# Patient Record
Sex: FEMALE | Race: OTHER | Employment: FULL TIME | URBAN - METROPOLITAN AREA
[De-identification: names, ages, dates, MRNs, and addresses within clinical notes are randomized per-mention and may not be internally consistent; named-entity substitution may affect disease eponyms.]

---

## 2024-04-24 ENCOUNTER — OFFICE VISIT (OUTPATIENT)
Dept: FAMILY MEDICINE CLINIC | Age: 29
End: 2024-04-24
Payer: COMMERCIAL

## 2024-04-24 VITALS
WEIGHT: 227 LBS | OXYGEN SATURATION: 97 % | SYSTOLIC BLOOD PRESSURE: 118 MMHG | TEMPERATURE: 97.8 F | HEIGHT: 67 IN | DIASTOLIC BLOOD PRESSURE: 80 MMHG | HEART RATE: 91 BPM | BODY MASS INDEX: 35.63 KG/M2

## 2024-04-24 DIAGNOSIS — M54.40 CHRONIC BILATERAL LOW BACK PAIN WITH SCIATICA, SCIATICA LATERALITY UNSPECIFIED: ICD-10-CM

## 2024-04-24 DIAGNOSIS — Z13.0 SCREENING FOR IRON DEFICIENCY ANEMIA: ICD-10-CM

## 2024-04-24 DIAGNOSIS — R20.2 PARESTHESIA AND PAIN OF BOTH UPPER EXTREMITIES: Primary | ICD-10-CM

## 2024-04-24 DIAGNOSIS — Z13.1 ENCOUNTER FOR SCREENING EXAMINATION FOR IMPAIRED GLUCOSE REGULATION AND DIABETES MELLITUS: ICD-10-CM

## 2024-04-24 DIAGNOSIS — R53.83 FATIGUE, UNSPECIFIED TYPE: ICD-10-CM

## 2024-04-24 DIAGNOSIS — M79.602 PARESTHESIA AND PAIN OF BOTH UPPER EXTREMITIES: Primary | ICD-10-CM

## 2024-04-24 DIAGNOSIS — M79.601 PARESTHESIA AND PAIN OF BOTH UPPER EXTREMITIES: Primary | ICD-10-CM

## 2024-04-24 DIAGNOSIS — F32.A DEPRESSION, UNSPECIFIED DEPRESSION TYPE: ICD-10-CM

## 2024-04-24 DIAGNOSIS — L50.3 DERMATOGRAPHIA: ICD-10-CM

## 2024-04-24 DIAGNOSIS — Z13.220 LIPID SCREENING: ICD-10-CM

## 2024-04-24 DIAGNOSIS — G89.29 CHRONIC BILATERAL LOW BACK PAIN WITH SCIATICA, SCIATICA LATERALITY UNSPECIFIED: ICD-10-CM

## 2024-04-24 DIAGNOSIS — M62.838 NECK MUSCLE SPASM: ICD-10-CM

## 2024-04-24 LAB
ALBUMIN SERPL-MCNC: 4.3 G/DL (ref 3.5–4.6)
ALP SERPL-CCNC: 55 U/L (ref 40–130)
ALT SERPL-CCNC: 18 U/L (ref 0–33)
ANION GAP SERPL CALCULATED.3IONS-SCNC: 10 MEQ/L (ref 9–15)
AST SERPL-CCNC: 28 U/L (ref 0–35)
BASOPHILS # BLD: 0 K/UL (ref 0–0.2)
BASOPHILS NFR BLD: 0.8 %
BILIRUB SERPL-MCNC: <0.2 MG/DL (ref 0.2–0.7)
BUN SERPL-MCNC: 14 MG/DL (ref 6–20)
CALCIUM SERPL-MCNC: 9.3 MG/DL (ref 8.5–9.9)
CHLORIDE SERPL-SCNC: 105 MEQ/L (ref 95–107)
CHOLEST SERPL-MCNC: 168 MG/DL (ref 0–199)
CO2 SERPL-SCNC: 22 MEQ/L (ref 20–31)
CREAT SERPL-MCNC: 0.71 MG/DL (ref 0.5–0.9)
EOSINOPHIL # BLD: 0.2 K/UL (ref 0–0.7)
EOSINOPHIL NFR BLD: 3.4 %
ERYTHROCYTE [DISTWIDTH] IN BLOOD BY AUTOMATED COUNT: 12.7 % (ref 11.5–14.5)
GLOBULIN SER CALC-MCNC: 3.3 G/DL (ref 2.3–3.5)
GLUCOSE SERPL-MCNC: 87 MG/DL (ref 70–99)
HCT VFR BLD AUTO: 40.3 % (ref 37–47)
HDLC SERPL-MCNC: 38 MG/DL (ref 40–59)
HGB BLD-MCNC: 13.5 G/DL (ref 12–16)
LDL CHOLESTEROL CALCULATED: 109 MG/DL (ref 0–129)
LYMPHOCYTES # BLD: 1.7 K/UL (ref 1–4.8)
LYMPHOCYTES NFR BLD: 34.2 %
MCH RBC QN AUTO: 28.5 PG (ref 27–31.3)
MCHC RBC AUTO-ENTMCNC: 33.5 % (ref 33–37)
MCV RBC AUTO: 85 FL (ref 79.4–94.8)
MONOCYTES # BLD: 0.4 K/UL (ref 0.2–0.8)
MONOCYTES NFR BLD: 7.4 %
NEUTROPHILS # BLD: 2.7 K/UL (ref 1.4–6.5)
NEUTS SEG NFR BLD: 53.8 %
PLATELET # BLD AUTO: 294 K/UL (ref 130–400)
POTASSIUM SERPL-SCNC: 4.3 MEQ/L (ref 3.4–4.9)
PROT SERPL-MCNC: 7.6 G/DL (ref 6.3–8)
RBC # BLD AUTO: 4.74 M/UL (ref 4.2–5.4)
SODIUM SERPL-SCNC: 137 MEQ/L (ref 135–144)
TRIGLYCERIDE, FASTING: 104 MG/DL (ref 0–150)
TSH REFLEX: 1.03 UIU/ML (ref 0.44–3.86)
WBC # BLD AUTO: 5 K/UL (ref 4.8–10.8)

## 2024-04-24 PROCEDURE — 99214 OFFICE O/P EST MOD 30 MIN: CPT | Performed by: PHYSICIAN ASSISTANT

## 2024-04-24 RX ORDER — ETONOGESTREL AND ETHINYL ESTRADIOL VAGINAL RING .015; .12 MG/D; MG/D
1 RING VAGINAL SEE ADMIN INSTRUCTIONS
COMMUNITY

## 2024-04-24 RX ORDER — LACTOBACILLUS RHAMNOSUS GG 10B CELL
CAPSULE ORAL
COMMUNITY

## 2024-04-24 SDOH — ECONOMIC STABILITY: FOOD INSECURITY: WITHIN THE PAST 12 MONTHS, YOU WORRIED THAT YOUR FOOD WOULD RUN OUT BEFORE YOU GOT MONEY TO BUY MORE.: NEVER TRUE

## 2024-04-24 SDOH — ECONOMIC STABILITY: HOUSING INSECURITY
IN THE LAST 12 MONTHS, WAS THERE A TIME WHEN YOU DID NOT HAVE A STEADY PLACE TO SLEEP OR SLEPT IN A SHELTER (INCLUDING NOW)?: NO

## 2024-04-24 SDOH — ECONOMIC STABILITY: FOOD INSECURITY: WITHIN THE PAST 12 MONTHS, THE FOOD YOU BOUGHT JUST DIDN'T LAST AND YOU DIDN'T HAVE MONEY TO GET MORE.: NEVER TRUE

## 2024-04-24 SDOH — ECONOMIC STABILITY: INCOME INSECURITY: HOW HARD IS IT FOR YOU TO PAY FOR THE VERY BASICS LIKE FOOD, HOUSING, MEDICAL CARE, AND HEATING?: NOT HARD AT ALL

## 2024-04-24 ASSESSMENT — PATIENT HEALTH QUESTIONNAIRE - PHQ9
2. FEELING DOWN, DEPRESSED OR HOPELESS: NOT AT ALL
4. FEELING TIRED OR HAVING LITTLE ENERGY: NEARLY EVERY DAY
8. MOVING OR SPEAKING SO SLOWLY THAT OTHER PEOPLE COULD HAVE NOTICED. OR THE OPPOSITE, BEING SO FIGETY OR RESTLESS THAT YOU HAVE BEEN MOVING AROUND A LOT MORE THAN USUAL: NOT AT ALL
9. THOUGHTS THAT YOU WOULD BE BETTER OFF DEAD, OR OF HURTING YOURSELF: NOT AT ALL
SUM OF ALL RESPONSES TO PHQ QUESTIONS 1-9: 8
3. TROUBLE FALLING OR STAYING ASLEEP: MORE THAN HALF THE DAYS
7. TROUBLE CONCENTRATING ON THINGS, SUCH AS READING THE NEWSPAPER OR WATCHING TELEVISION: SEVERAL DAYS
SUM OF ALL RESPONSES TO PHQ QUESTIONS 1-9: 8
10. IF YOU CHECKED OFF ANY PROBLEMS, HOW DIFFICULT HAVE THESE PROBLEMS MADE IT FOR YOU TO DO YOUR WORK, TAKE CARE OF THINGS AT HOME, OR GET ALONG WITH OTHER PEOPLE: NOT DIFFICULT AT ALL
6. FEELING BAD ABOUT YOURSELF - OR THAT YOU ARE A FAILURE OR HAVE LET YOURSELF OR YOUR FAMILY DOWN: NOT AT ALL
SUM OF ALL RESPONSES TO PHQ QUESTIONS 1-9: 8
SUM OF ALL RESPONSES TO PHQ QUESTIONS 1-9: 8
SUM OF ALL RESPONSES TO PHQ9 QUESTIONS 1 & 2: 0
5. POOR APPETITE OR OVEREATING: MORE THAN HALF THE DAYS
1. LITTLE INTEREST OR PLEASURE IN DOING THINGS: NOT AT ALL
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
## 2024-04-25 LAB
FOLATE: >20 NG/ML (ref 4.8–24.2)
VITAMIN B-12: 320 PG/ML (ref 232–1245)
VITAMIN D 25-HYDROXY: 40.8 NG/ML (ref 30–100)

## 2024-04-28 LAB
A ALTERNATA IGE QN: <0.1 KU/L (ref 0–0.34)
A FUMIGATUS IGE QN: <0.1 KU/L (ref 0–0.34)
AMER SYCAMORE IGE QN: <0.1 KU/L (ref 0–0.34)
BERMUDA GRASS IGE QN: <0.1 KU/L (ref 0–0.34)
BOXELDER IGE QN: 0.73 KU/L (ref 0–0.34)
C SPHAEROSPERMUM IGE QN: <0.1 KUL/L (ref 0–0.34)
CALIF WALNUT IGE QN: <0.1 KU/L (ref 0–0.34)
CAT DANDER IGE QN: <0.1 KU/L (ref 0–0.34)
CMN PIGWEED IGE QN: <0.1 KU/L (ref 0–0.34)
COMMON RAGWEED IGE QN: <0.1 KU/L (ref 0–0.34)
COTTONWOOD IGE QN: <0.1 KU/L (ref 0–0.34)
D FARINAE IGE QN: 1.83 KU/L (ref 0–0.34)
D PTERONYSS IGE QN: 0.53 KU/L (ref 0–0.34)
DOG DANDER IGE QN: <0.1 KU/L (ref 0–0.34)
IGE SERPL-ACNC: 223 IU/ML (ref 0–100)
M RACEMOSUS IGE QN: <0.1 KU/L (ref 0–0.34)
MOUSE EPITH IGE QN: <0.1 KU/L (ref 0–0.34)
P NOTATUM IGE QN: <0.1 KU/L (ref 0–0.34)
PECAN/HICK TREE IGE QN: <0.1 KU/L (ref 0–0.34)
RED CEDAR IGE QN: <0.1 KU/L (ref 0–0.34)
ROACH IGE QN: <0.1 KU/L (ref 0–0.34)
SALTWORT IGE QN: <0.1 KU/L (ref 0–0.34)
SHEEP SORREL IGE QN: <0.1 KU/L (ref 0–0.34)
SILVER BIRCH IGE QN: <0.1 KU/L (ref 0–0.34)
TIMOTHY IGE QN: <0.1 KU/L (ref 0–0.34)
WHITE ASH IGE QN: <0.1 KU/L (ref 0–0.34)
WHITE ELM IGE QN: <0.1 KU/L (ref 0–0.34)
WHITE MULBERRY IGE QN: <0.1 KU/L (ref 0–0.34)
WHITE OAK IGE QN: <0.1 KU/L (ref 0–0.34)

## 2024-04-29 ENCOUNTER — TELEPHONE (OUTPATIENT)
Dept: FAMILY MEDICINE CLINIC | Age: 29
End: 2024-04-29

## 2024-04-29 RX ORDER — LORATADINE 10 MG/1
10 TABLET ORAL DAILY
Qty: 30 TABLET | Refills: 3 | Status: SHIPPED | OUTPATIENT
Start: 2024-04-29

## 2024-04-29 NOTE — TELEPHONE ENCOUNTER
Prescription for Claritin take as needed if symptoms are worsening may need to take for daily for a week then may resume as needed

## 2024-04-29 NOTE — TELEPHONE ENCOUNTER
Patient asking if there is anything that can be sent in for her allergies to to the dust mites and the boxelder maple     Please send RX to- [facility] on [address]

## 2024-05-16 ENCOUNTER — HOSPITAL ENCOUNTER (OUTPATIENT)
Dept: PHYSICAL THERAPY | Age: 29
Setting detail: THERAPIES SERIES
Discharge: HOME | End: 2024-05-16
Payer: COMMERCIAL

## 2024-05-16 PROCEDURE — 97161 PT EVAL LOW COMPLEX 20 MIN: CPT

## 2024-05-16 PROCEDURE — 97110 THERAPEUTIC EXERCISES: CPT

## 2024-05-16 ASSESSMENT — PAIN DESCRIPTION - DESCRIPTORS: DESCRIPTORS: ACHING;THROBBING

## 2024-05-16 ASSESSMENT — PAIN DESCRIPTION - PAIN TYPE: TYPE: CHRONIC PAIN

## 2024-05-16 ASSESSMENT — PAIN DESCRIPTION - ORIENTATION: ORIENTATION: LOWER

## 2024-05-16 ASSESSMENT — PAIN DESCRIPTION - LOCATION: LOCATION: BACK

## 2024-05-16 ASSESSMENT — PAIN SCALES - GENERAL: PAINLEVEL_OUTOF10: 0

## 2024-05-16 NOTE — PLAN OF CARE
[address] [place] [address]  Phone: [phone]    [] Certification  [] Recertification [x]  Plan of Care  [] Progress Note [] Discharge      Referring Provider: Le Olivares PA-C     From:  Anel Sinclair, PT, DPT  Patient: Dian Montgomery (29 y.o. female) : 1995 Date: 2024  Medical Diagnosis: Paresthesia and pain of both upper extremities [R20.2, M79.601, M79.602]  Chronic bilateral low back pain with sciatica, sciatica laterality unspecified [M54.40, G89.29]  Neck muscle spasm [M62.838]       Treatment Diagnosis: LBP, B hand N/T, decreased B hip IR/ER AROM, decreased core and trunk strength, decreased Hip strength, decreased postural awareness, decreased cervical AROM, and decreased postural strength/periscap strength    Plan of Care/Certification Expiration Date:     Progress Report Period from:  2024  to 2024    Visits to Date: 1 No Show: 0 Cancelled Appts: 0    OBJECTIVE:   Short Term Goals - Time Frame for Short Term Goals: 2-3 weeks    Goals Current/Discharge status  Status   Short Term Goal 1: The pt will demo improved postural awareness with </=25% VC's required throughout tx   Poor postural awareness   New     Long Term Goals - Time Frame for Long Term Goals : 4-6 weeks  Goals Current/ Discharge status Status   Long Term Goal 1: The pt will have a decrease in TERESE and NDI score to </=2/50 points in order to increase functional activity tolerance  Exam: TERESE 10/45 NDI 7/45     New   Long Term Goal 2: The pt will be indep/compliant with HEP in order to self-manage symptoms upon D/C  ongoing   New   Long Term Goal 3: The pt will demo improved B hip IR/ER AROM WNL's in order to increase ease of work related duties   AROM LLE (degrees)  L Hip External Rotation (0-45): 36  L Hip Internal Rotation (0-45): 42   AROM RLE (degrees)  R Hip External Rotation (0-45): 30  R Hip Internal Rotation (0-45): 38    New   Long Term Goal 4: The pt will demo

## 2024-05-16 NOTE — THERAPY EVALUATION
[address] [place] [address]  Phone: [phone]                             Physical Therapy: Initial Evaluation    Patient: Dian Montgomery (29 y.o.     female)   Examination Date: 2024   :  1995 ;    Confirmed: Yes MRN: [id number]  CSN: [id number]   Insurance: Payor: [facility] / Plan: [facility] [place] PPO / Product Type: *No Product type* /   Insurance ID: [id number] - ([facility])    Secondary Insurance (if applicable):     Referring Physician: Le Olivares PA-C       Visits to Date/Visits Approved:     No Show/Cancelled Appts: 0 /      Medical Diagnosis: Paresthesia and pain of both upper extremities [R20.2, M79.601, M79.602]  Chronic bilateral low back pain with sciatica, sciatica laterality unspecified [M54.40, G89.29]  Neck muscle spasm [M62.838]        Treatment Diagnosis: LBP, B hand N/T, decreased B hip IR/ER AROM, decreased core and trunk strength, decreased Hip strength, decreased postural awareness, decreased cervical AROM, and decreased postural strength/periscap strength     PERTINENT MEDICAL HISTORY   Patient Assessed for Rehabilitation Services: Yes       Medical History: Chart Reviewed: Yes   Past Medical History:   Diagnosis Date    Anxiety     Degeneration of eye     Both    Postpartum depression     Seizures (HCC)     last episode over 10 years ago, dont take anything     Surgical History:   Past Surgical History:   Procedure Laterality Date    EYE SURGERY Bilateral     laser eye surgery       Medications:   Current Outpatient Medications:     loratadine (CLARITIN) 10 MG tablet, Take 1 tablet by mouth daily, Disp: 30 tablet, Rfl: 3    Multiple Vitamins-Minerals (CULTURELLE PROBIOTICS + MULTIV) CHEW, Take by mouth, Disp: , Rfl:     etonogestrel-ethinyl estradiol (NUVARING) 0.12-0.015 MG/24HR vaginal ring, Place 1 each vaginally See Admin Instructions, Disp: , Rfl:   Allergies: Patient has no known allergies.      Imaging

## 2024-05-21 ENCOUNTER — HOSPITAL ENCOUNTER (OUTPATIENT)
Dept: PHYSICAL THERAPY | Age: 29
Setting detail: THERAPIES SERIES
Discharge: HOME | End: 2024-05-21
Payer: COMMERCIAL

## 2024-05-21 PROCEDURE — 97110 THERAPEUTIC EXERCISES: CPT

## 2024-05-21 NOTE — PROGRESS NOTES
[facility] [address] [place] [address]  Phone: [phone]      Date: 2024  Patient: Dian Montgomery  : 1995   Confirmed: Yes  MRN: [id number]  Referring Provider: Le Olivares PA-C    Medical Diagnosis: Paresthesia and pain of both upper extremities [R20.2, M79.601, M79.602]  Chronic bilateral low back pain with sciatica, sciatica laterality unspecified [M54.40, G89.29]  Neck muscle spasm [M62.838]       Treatment Diagnosis: LBP, B hand N/T, decreased B hip IR/ER AROM, decreased core and trunk strength, decreased Hip strength, decreased postural awareness, decreased cervical AROM, and decreased postural strength/periscap strength    Visit Information:  Insurance: Payor: [employer] / Plan: [facility] PPO / Product Type: *No Product type* /   PT Visit Information  Total # of Visits Approved: 24  Total # of Visits to Date: 2  No Show: 0  Canceled Appointment: 0  Progress Note Counter: -    Subjective Information:  Subjective: Pt arrived to therapy today reporting no pain at this moment.  Mild tightness following last visit.  HEP Compliance:  [x] Good [] Fair [] Poor [] Reports not doing due to:    Pain Screening  Patient Currently in Pain: Denies    Treatment:  Exercises:  Exercises  Exercise 1: Supine piriformis stretch 30s x 3 (Figure 4 and single knee to opposite chest)  Exercise 2: Supine TA isometrics 5s hold x10  Exercise 3: DLS: March 3\" x 10  Exercise 4: Abdominal walkouts: x 10 able to reach full ext  Exercise 5: Bridges: w/ glute sets 5\" x 10  Exercise 6: Roll for control x 10 for full ROM  x 10 with ball/Red T-band  Exercise 7: DKTC Ball Rolls 5\" x 10  Exercise 20: HEP: continue with current.       Manual:           Modalities:          *Indicates exercise, modality, or manual techniques to be initiated when appropriate    Objective Measures:                                                                         Assessment:   Body Structures, Functions,  detailed exam

## 2024-05-24 ENCOUNTER — HOSPITAL ENCOUNTER (OUTPATIENT)
Dept: PHYSICAL THERAPY | Age: 29
Setting detail: THERAPIES SERIES
Discharge: HOME | End: 2024-05-24
Payer: COMMERCIAL

## 2024-05-24 PROCEDURE — 97530 THERAPEUTIC ACTIVITIES: CPT

## 2024-05-24 PROCEDURE — 97110 THERAPEUTIC EXERCISES: CPT

## 2024-05-24 ASSESSMENT — PAIN DESCRIPTION - PAIN TYPE: TYPE: CHRONIC PAIN

## 2024-05-24 ASSESSMENT — PAIN DESCRIPTION - DESCRIPTORS: DESCRIPTORS: ACHING

## 2024-05-24 ASSESSMENT — PAIN DESCRIPTION - LOCATION: LOCATION: BACK

## 2024-05-24 ASSESSMENT — PAIN DESCRIPTION - ORIENTATION: ORIENTATION: LOWER

## 2024-05-24 ASSESSMENT — PAIN SCALES - GENERAL: PAINLEVEL_OUTOF10: 1

## 2024-05-24 NOTE — PROGRESS NOTES
hold 2x10  Exercise 19: education on lumbar (seated) and cervical (supine) support  Exercise 20: HEP: updated TA alanna; bridge, rows, lats RTB, postural awareness handouts       *Indicates exercise, modality, or manual techniques to be initiated when appropriate    Objective Measures:     Assessment:   Body Structures, Functions, Activity Limitations Requiring Skilled Therapeutic Intervention: Decreased ADL status, Decreased ROM, Decreased tolerance to work activity, Decreased strength, Decreased posture, Decreased high-level IADLs, Increased pain  Assessment: A lot of education provided this visit re: seated and supine/sidelying posture, lifting techniques, and bed mob/transfers to support cervical and lumbar spine. A lot of verbal and visual/tactile cueing needed for correct TA activation w/ eventual good follow through/carryover w/ pt verbalizing much improved understanding. Progressed HEP w/ pt verbalizing good understanding.  Treatment Diagnosis: LBP, B hand N/T, decreased B hip IR/ER AROM, decreased core and trunk strength, decreased Hip strength, decreased postural awareness, decreased cervical AROM, and decreased postural strength/periscap strength  Therapy Prognosis: Good       Patient Education: [] NA   See above    Post-Pain Assessment:       Pain Rating (0-10 pain scale):   0/10   Location and pain description same as pre-treatment unless indicated.   Action: [] NA   [x] Perform HEP  [] Meds as prescribed  [] Modalities as prescribed   [] Call Physician     GOALS   Patient Goal(s): Patient Goals : \"Back pain, possible neck to help with hand/finger numbness\"    Short Term Goals Completed by 2-3 weeks Goal Status   STG 1 The pt will demo improved postural awareness with </=25% VC's required throughout tx In progress     Long Term Goals Completed by 4-6 weeks Goal Status   LTG 1 The pt will have a decrease in TERESE and NDI score to </=2/50 points in order to increase functional activity tolerance In progress

## 2024-05-29 ENCOUNTER — APPOINTMENT (OUTPATIENT)
Dept: PHYSICAL THERAPY | Age: 29
End: 2024-05-29
Payer: COMMERCIAL

## 2024-05-31 ENCOUNTER — HOSPITAL ENCOUNTER (OUTPATIENT)
Dept: PHYSICAL THERAPY | Age: 29
Setting detail: THERAPIES SERIES
Discharge: HOME | End: 2024-05-31
Payer: COMMERCIAL

## 2024-05-31 PROCEDURE — 97110 THERAPEUTIC EXERCISES: CPT

## 2024-05-31 ASSESSMENT — PAIN DESCRIPTION - PAIN TYPE: TYPE: CHRONIC PAIN

## 2024-05-31 NOTE — PROGRESS NOTES
[facility]  [address] [place] [address]  Phone: [phone]      Date: 2024  Patient: Dian Montgomery  : 1995   Confirmed: Yes  MRN: [id number]  Referring Provider: Le Olivares PA-C    Medical Diagnosis: Paresthesia and pain of both upper extremities [R20.2, M79.601, M79.602]  Chronic bilateral low back pain with sciatica, sciatica laterality unspecified [M54.40, G89.29]  Neck muscle spasm [M62.838]       Treatment Diagnosis: LBP, B hand N/T, decreased B hip IR/ER AROM, decreased core and trunk strength, decreased Hip strength, decreased postural awareness, decreased cervical AROM, and decreased postural strength/periscap strength    Visit Information:  Insurance: Payor: [facility] / Plan: [facility] PPO / Product Type: *No Product type* /   PT Visit Information  Total # of Visits Approved: 24  Total # of Visits to Date: 4  No Show: 0  Canceled Appointment: 0  Progress Note Counter: -    Subjective Information:  Subjective: Pt reports becoming more self aware of postural corrections. Improved positions when moving pt's at work, with increased focus on body mechanices reported by pt. Today the low back and hips are really stiff.  HEP Compliance:  [] Good [x] Fair [] Poor [] Reports not doing due to:    Pain Screening  Pain Type: Chronic pain    Treatment:  Exercises:  Exercises  Exercise 1: Supine piriformis stretch 30s x 3 (Figure 4 and single knee to opposite chest)  Exercise 2: Supine TA isometrics 5s hold x10 - significant cueing for correct techniques w/ eventual good follow through/carryover  Exercise 3: DLS: March 3\" x 10  Exercise 5: Bridges: w/ glute sets 5\" x 10  Exercise 6: Roll for control x 10 for full ROM  x 10 with ball/Red T-band  Exercise 9: Supine T-band chest pulls RTB IR/ER x 10 ea.  Exercise 11: Scap squeezes for posture  Exercise 20: HEP: continue with current + T-band chest pulls       Manual:           Modalities:          *Indicates exercise,

## 2024-06-03 ENCOUNTER — HOSPITAL ENCOUNTER (OUTPATIENT)
Dept: PHYSICAL THERAPY | Age: 29
Setting detail: THERAPIES SERIES
Discharge: HOME | End: 2024-06-03
Payer: COMMERCIAL

## 2024-06-03 PROCEDURE — 97110 THERAPEUTIC EXERCISES: CPT

## 2024-06-03 NOTE — PROGRESS NOTES
[facility]  [address] [place] [address]  Phone: [phone]      Date: 6/3/2024  Patient: Dian Montgomery  : 1995   Confirmed: Yes  MRN: [id number]  Referring Provider: Le Olivares PA-C    Medical Diagnosis: Paresthesia and pain of both upper extremities [R20.2, M79.601, M79.602]  Chronic bilateral low back pain with sciatica, sciatica laterality unspecified [M54.40, G89.29]  Neck muscle spasm [M62.838]       Treatment Diagnosis: LBP, B hand N/T, decreased B hip IR/ER AROM, decreased core and trunk strength, decreased Hip strength, decreased postural awareness, decreased cervical AROM, and decreased postural strength/periscap strength    Visit Information:  Insurance: Payor: [facility] / Plan: [facility] PPO / Product Type: *No Product type* /   PT Visit Information  Total # of Visits Approved: 24  Total # of Visits to Date: 5  No Show: 0  Canceled Appointment: 0  Progress Note Counter: -    Subjective Information:  Subjective: Pt has been mostly compliant w/ HEP but has been completing every other day.  Pt feels more aware w/ posture corrections while she is at her job.  HEP Compliance:  [] Good [x] Fair [] Poor [] Reports not doing due to:    Pain Screening  Patient Currently in Pain: Denies    Treatment:  Exercises:  Exercises  Exercise 1: Supine piriformis stretch 30s x 3 (Figure 4 and single knee to opposite chest)  Exercise 2: Supine TA isometrics 5s hold x15 -  VC for correct techniques w/ eventual good follow through/carryover, pt completes while focusing on breathing  Exercise 3: DLS: March 3\" x 10, BLE ER, UUE flexion, UUE flexion on both sides x 10, UUE flexion w/ ULE flexion on both sides x 10  Exercise 5: Bridges: w/ glute sets 5\" x 15  Exercise 9: Supine T-band chest pulls RTB IR/ER x 10 ea.  Exercise 10: rows/lats RTB 5'' hold 2x10  Exercise 12: prone scap: IYTs  Exercise 20: HEP: continue with current + T-band chest pulls       *Indicates exercise, modality,

## 2024-06-06 ENCOUNTER — HOSPITAL ENCOUNTER (OUTPATIENT)
Dept: PHYSICAL THERAPY | Age: 29
Setting detail: THERAPIES SERIES
Discharge: HOME | End: 2024-06-06
Payer: COMMERCIAL

## 2024-06-06 NOTE — PROGRESS NOTES
Therapy                            Cancellation/No-show Note      Date: 2024  Patient: Dian Montgomery (29 y.o. female)  : 1995  MRN:  [id number]  Referring Physician: Le Olivares PA-C    Medical Diagnosis: Paresthesia and pain of both upper extremities [R20.2, M79.601, M79.602]  Chronic bilateral low back pain with sciatica, sciatica laterality unspecified [M54.40, G89.29]  Neck muscle spasm [M62.838]      Visit Information:  Visits to Date 5   No Show/Cancelled Appts: 0       For today's appointment patient:  [x]  Cancelled  []  Rescheduled appointment  []  No-show   []  Called pt to remind of next appointment     Reason given by patient:  [x]  Patient ill  []  Conflicting appointment  []  No transportation    []  Conflict with work  []  No reason given  []  Other:      [x] Pt has future appointments scheduled, no follow up needed  [] Pt requests to be on hold.    Reason:   If > 2 weeks please discuss with therapist.  [] Therapist to call pt for follow up     Comments:   Pt is feeling sick    Signature: Electronically signed by Emely Maxwell PT on 24 at 12:58 PM EDT

## 2024-06-10 ENCOUNTER — HOSPITAL ENCOUNTER (OUTPATIENT)
Dept: PHYSICAL THERAPY | Age: 29
Setting detail: THERAPIES SERIES
Discharge: HOME | End: 2024-06-10
Payer: COMMERCIAL

## 2024-06-10 NOTE — PROGRESS NOTES
Therapy                            Cancellation/No-show Note      Date: 06/10/2024  Patient: Dian Montgomery (29 y.o. female)  : 1995  MRN:  [id number]  Referring Physician: Le Olivares PA-C    Medical Diagnosis: Paresthesia and pain of both upper extremities [R20.2, M79.601, M79.602]  Chronic bilateral low back pain with sciatica, sciatica laterality unspecified [M54.40, G89.29]  Neck muscle spasm [M62.838]      Visit Information:  Visits to Date     No Show/Cancelled Appts:       For today's appointment patient:  []  Cancelled  []  Rescheduled appointment  [x]  No-show   [x]  Called pt to remind of next appointment     Reason given by patient:  []  Patient ill  []  Conflicting appointment  []  No transportation    []  Conflict with work  []  No reason given  []  Other:      [x] Pt has future appointments scheduled, no follow up needed  [] Pt requests to be on hold.    Reason:   If > 2 weeks please discuss with therapist.  [] Therapist to call pt for follow up     Comments:   No answer, left VM of next scheduled date and time.     Signature: Electronically signed by Rickey Rose PTA on 6/10/24 at 11:14 AM EDT

## 2024-06-13 ENCOUNTER — HOSPITAL ENCOUNTER (OUTPATIENT)
Dept: PHYSICAL THERAPY | Age: 29
Setting detail: THERAPIES SERIES
Discharge: HOME | End: 2024-06-13
Payer: COMMERCIAL

## 2024-06-13 NOTE — PROGRESS NOTES
Therapy                            Cancellation/No-show Note      Date: 2024  Patient: Dian Montgomery (29 y.o. female)  : 1995  MRN:  [id number]  Referring Physician: Le Olivares PA-C    Medical Diagnosis: Paresthesia and pain of both upper extremities [R20.2, M79.601, M79.602]  Chronic bilateral low back pain with sciatica, sciatica laterality unspecified [M54.40, G89.29]  Neck muscle spasm [M62.838]      Visit Information:  Visits to Date 5   No Show/Cancelled Appts:       For today's appointment patient:  [x]  Cancelled  []  Rescheduled appointment  []  No-show   []  Called pt to remind of next appointment     Reason given by patient:  [x]  Patient ill  []  Conflicting appointment  []  No transportation    []  Conflict with work  []  No reason given  []  Other:      [] Pt has future appointments scheduled, no follow up needed  [] Pt requests to be on hold.    Reason:   If > 2 weeks please discuss with therapist.  [] Therapist to call pt for follow up-     Comments: Pt requests to be placed on hold for 2 weeks d/t busy schedule with daughter starting volleyball. Call to f/u to cont vs D/C in 2 weeks 24.       Signature: Electronically signed by Anel Sinclair PT on 24 at 8:59 AM EDT

## 2024-09-05 DIAGNOSIS — R29.898 COMPLAINTS OF WEAKNESS OF LOWER EXTREMITY: Primary | ICD-10-CM
